# Patient Record
Sex: FEMALE | Race: WHITE | URBAN - METROPOLITAN AREA
[De-identification: names, ages, dates, MRNs, and addresses within clinical notes are randomized per-mention and may not be internally consistent; named-entity substitution may affect disease eponyms.]

---

## 2020-01-10 ENCOUNTER — OTHER (OUTPATIENT)
Dept: URBAN - METROPOLITAN AREA CLINIC 18 | Facility: CLINIC | Age: 35
Setting detail: DERMATOLOGY
End: 2020-01-10

## 2020-01-10 DIAGNOSIS — L81.4 OTHER MELANIN HYPERPIGMENTATION: ICD-10-CM

## 2020-01-10 DIAGNOSIS — L90.5 SCAR CONDITIONS AND FIBROSIS OF SKIN: ICD-10-CM

## 2020-01-10 DIAGNOSIS — Z86.03 PERSONAL HISTORY OF NEOPLASM OF UNCERTAIN BEHAVIOR: ICD-10-CM

## 2020-01-10 DIAGNOSIS — D22.5 MELANOCYTIC NEVI OF TRUNK: ICD-10-CM

## 2020-01-10 DIAGNOSIS — L82.1 OTHER SEBORRHEIC KERATOSIS: ICD-10-CM

## 2020-01-10 PROBLEM — L28.1 PRURIGO NODULARIS: Status: RESOLVED | Noted: 2020-01-10

## 2020-01-10 PROCEDURE — 11103 TANGNTL BX SKIN EA SEP/ADDL: CPT

## 2020-01-10 PROCEDURE — 99203 OFFICE O/P NEW LOW 30 MIN: CPT

## 2020-01-10 PROCEDURE — 11102 TANGNTL BX SKIN SINGLE LES: CPT

## 2020-01-17 ENCOUNTER — RX ONLY (RX ONLY)
Age: 35
End: 2020-01-17

## 2021-02-13 ENCOUNTER — APPOINTMENT (OUTPATIENT)
Dept: URBAN - METROPOLITAN AREA CLINIC 273 | Age: 36
Setting detail: DERMATOLOGY
End: 2021-02-13

## 2021-02-13 DIAGNOSIS — L98.1 FACTITIAL DERMATITIS: ICD-10-CM

## 2021-02-13 PROBLEM — L29.9 PRURITUS, UNSPECIFIED: Status: ACTIVE | Noted: 2021-02-13

## 2021-02-13 PROCEDURE — OTHER ADDITIONAL NOTES: OTHER

## 2021-02-13 PROCEDURE — OTHER PRESCRIPTION: OTHER

## 2021-02-13 PROCEDURE — 99202 OFFICE O/P NEW SF 15 MIN: CPT

## 2021-02-13 RX ORDER — MUPIROCIN 20 MG/G
OINTMENT TOPICAL
Qty: 1 | Refills: 0 | Status: ERX | COMMUNITY
Start: 2021-02-13

## 2021-02-13 NOTE — HPI: EVALUATION OF SKIN LESION(S)
What Type Of Note Output Would You Prefer (Optional)?: Bullet Format
How Severe Are Your Spot(S)?: severe
Have Your Spot(S) Been Treated In The Past?: has not been treated
Hpi Title: Evaluation of Skin Lesions
Additional History: Pt applying lotrimin spray and covering facial lesions with bandaids.

## 2021-02-13 NOTE — PROCEDURE: ADDITIONAL NOTES
Detail Level: Simple
Render Risk Assessment In Note?: no
Additional Notes: Today patient with erythematous excoriations left corner of mouth, right cheek and left temple. Pt states she has seen many providers without resolution. She complains of “parasites” of the scalp, face, arms and in her stool. There is no active rash on scalp or arms today, only post inflammatory hyperpigmentation bilateral forearms and hands and a healing excoriation of the right wrist today. The scalp is clear. She complains of “black dots” on scalp and is pulling hair out at base of scalp. No rash on scalp and no evidence of parasites on exam. She denies having stool tested for parasites and denies anal itching today. There is hyperpigmentation of the bilateral temples from repeated rubbing, which she was doing in the room. She states she applies lotrimin spray and tea tree oil. Pt upset when I first entered the room and immediately denies picking when advised not to pick or repeatedly rub lesions. She denies taking drugs (without being asked) and states she is only on suboxone. While in the room she called her dad and asked for photographs he took of her arms, but he did not send, and then called her mom and repeatedly asked her to tell me that she does not pick at her lesions. I recommended mupirocin oint for the facial lesions bid but pt stated she was not going to get it and left the office mad because I could not help her since I did not feel this should be treated as a parasitic infection. I spent 20-25 minutes talking with patient today , performing physical exam, discussing treatment for active facial lesions, and documenting in patients chart.